# Patient Record
Sex: FEMALE | Race: WHITE | NOT HISPANIC OR LATINO | Employment: FULL TIME | URBAN - METROPOLITAN AREA
[De-identification: names, ages, dates, MRNs, and addresses within clinical notes are randomized per-mention and may not be internally consistent; named-entity substitution may affect disease eponyms.]

---

## 2024-01-11 ENCOUNTER — HOSPITAL ENCOUNTER (EMERGENCY)
Facility: HOSPITAL | Age: 19
Discharge: HOME | End: 2024-01-11
Attending: EMERGENCY MEDICINE
Payer: MEDICAID

## 2024-01-11 ENCOUNTER — APPOINTMENT (OUTPATIENT)
Dept: GENERAL RADIOLOGY | Facility: HOSPITAL | Age: 19
End: 2024-01-11
Payer: MEDICAID

## 2024-01-11 VITALS
HEART RATE: 101 BPM | OXYGEN SATURATION: 100 % | RESPIRATION RATE: 20 BRPM | DIASTOLIC BLOOD PRESSURE: 72 MMHG | TEMPERATURE: 98.2 F | HEIGHT: 64 IN | SYSTOLIC BLOOD PRESSURE: 136 MMHG | WEIGHT: 195 LBS | BODY MASS INDEX: 33.29 KG/M2

## 2024-01-11 DIAGNOSIS — S62.637A CLOSED DISPLACED FRACTURE OF DISTAL PHALANX OF LEFT LITTLE FINGER, INITIAL ENCOUNTER: Primary | ICD-10-CM

## 2024-01-11 PROCEDURE — 73130 X-RAY EXAM OF HAND: CPT

## 2024-01-11 PROCEDURE — 99283 EMERGENCY DEPT VISIT LOW MDM: CPT

## 2024-01-11 NOTE — Clinical Note
CHERYL LAMAS  [facility] EMERGENCY DEPARTMENT  [address] [place] [address]  Phone: [phone]    Sharon Lopez was seen and treated in our emergency department on 1/11/2024.  She may return to work on 01/18/2024.         Thank you for choosing [facility].    Yoel Gillespie MD

## 2024-01-12 NOTE — ED NOTES
Patient finger was splinted and wrapped with adherent bandage. The patient was provided additional adherent bandage to be utilized until her follow-up appointment.

## 2024-01-12 NOTE — ED PROVIDER NOTES
"Subjective     History provided by:  Patient    History of Present Illness    Chief complaint: Finger injury    Location: Left pinky    Quality/Severity: Bruising and pain and tenderness of the left pinky.    Timing/Onset: Injury occurred at 5 AM this morning.    Modifying Factors: Movement of the pinky exacerbates pain.    Associated symptoms: Loss of sensation.  Denies other injuries.    Narrative: The patient is a 18-year-old white female who slipped and fell at 5 AM this morning injuring her left pinky.  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  She denies previous injuries to the left hand.    Review of Systems  Past Medical History:   Diagnosis Date    ADHD (attention deficit hyperactivity disorder)     Bipolar affective disorder, currently active      /72 (BP Location: Right arm, Patient Position: Sitting)   Pulse 101   Temp 98.2 °F (36.8 °C) (Oral)   Resp 20   Ht 162.6 cm (64\")   Wt 88.5 kg (195 lb)   SpO2 100%   BMI 33.47 kg/m²     Past Medical History:   Diagnosis Date    ADHD (attention deficit hyperactivity disorder)     Bipolar affective disorder, currently active        Allergies   Allergen Reactions    Penicillins Anaphylaxis       Past Surgical History:   Procedure Laterality Date    ADENOIDECTOMY      EAR TUBES      TONSILLECTOMY         Family History   Problem Relation Age of Onset    Diabetes Mother        Social History     Socioeconomic History    Marital status: Single   Tobacco Use    Smoking status: Never    Smokeless tobacco: Never   Vaping Use    Vaping Use: Never used   Substance and Sexual Activity    Alcohol use: Not Currently    Drug use: Never    Sexual activity: Defer           Objective   Physical Exam  Vitals and nursing note reviewed.   Constitutional:       General: She is not in acute distress.     Appearance: Normal appearance. She is not ill-appearing, toxic-appearing or diaphoretic.      Comments: Patient appears " healthy in no acute distress.  Review of her vital signs: She is slightly tachycardic with heart rate of 101, remainder vital signs within normal limits.   HENT:      Head: Normocephalic and atraumatic.   Eyes:      General: No scleral icterus.     Conjunctiva/sclera: Conjunctivae normal.   Musculoskeletal:      Comments: The patient's left pinky has no bony deformity.  Is mild swelling and ecchymosis along its length.  The nail and nailbed are intact.  She has tenderness over the DIP joint and has pain with extending it.  She is nontender over the PIP joint or the MP joint.  The finger is neurovascularly intact.   Skin:     Capillary Refill: Capillary refill takes less than 2 seconds.      Findings: Bruising (Along the length of the left pinky.) present.   Neurological:      General: No focal deficit present.      Mental Status: She is alert and oriented to person, place, and time.      Cranial Nerves: No cranial nerve deficit.      Sensory: No sensory deficit.      Motor: No weakness.   Psychiatric:         Mood and Affect: Mood normal.         Behavior: Behavior normal.         Thought Content: Thought content normal.         Judgment: Judgment normal.         Procedures           ED Course  ED Course as of 01/11/24 2052 Thu Jan 11, 2024 2020 The patient's left hand x-rays show a fracture of the dorsal proximal aspect of the distal phalanx of the left fifth finger. [TP]   2026 The patient's left fifth finger was splinted in extension with an AlumaFoam splint.  Her finger was neurovascularly intact after splinting. [TP]   2026 The patient will be referred to follow-up with [facility] surgeons.  She was instructed to keep her finger in the splint until seen by hand specialist. [TP]   2046 The patient was placed off work for a week to allow her time to follow-up with a hand surgeon. [TP]      ED Course User Index  [TP] Yoel Gillespie MD                                             Medical Decision  Making  Problems Addressed:  Closed displaced fracture of distal phalanx of left little finger, initial encounter: complicated acute illness or injury    Amount and/or Complexity of Data Reviewed  Radiology: ordered. Decision-making details documented in ED Course.        Final diagnoses:   Closed displaced fracture of distal phalanx of left little finger, initial encounter       ED Disposition  ED Disposition       ED Disposition   Discharge    Condition   Stable    Comment   Patient provided work note. Patient to return to work on 01/18/2024.                 [facility]  [address]  [facility] [id number] [phone]  Call in 1 day  To make an appointment to be seen tomorrow.         Medication List      No changes were made to your prescriptions during this visit.           Labs Reviewed - No data to display  XR Hand 3+ View Left   Final Result   Slightly displaced fractures of the proximal dorsal corner of the fifth distal phalanx      Signer Name: Cecil Delong MD    Signed: 1/11/2024 8:11 PM EST   [facility]             Medication List      No changes were made to your prescriptions during this visit.              Yoel Gillespie MD  01/11/24 2052